# Patient Record
Sex: FEMALE | Race: WHITE | NOT HISPANIC OR LATINO | Employment: UNEMPLOYED
[De-identification: names, ages, dates, MRNs, and addresses within clinical notes are randomized per-mention and may not be internally consistent; named-entity substitution may affect disease eponyms.]

---

## 2022-12-07 ENCOUNTER — TRANSCRIBE ORDERS (OUTPATIENT)
Dept: OTHER | Age: 32
End: 2022-12-07

## 2022-12-07 DIAGNOSIS — J37.0 CHRONIC LARYNGITIS: Primary | ICD-10-CM

## 2022-12-07 DIAGNOSIS — R47.89 OTHER SPEECH DISTURBANCES: ICD-10-CM

## 2023-01-03 ENCOUNTER — TRANSFERRED RECORDS (OUTPATIENT)
Dept: HEALTH INFORMATION MANAGEMENT | Facility: CLINIC | Age: 33
End: 2023-01-03

## 2023-02-13 ENCOUNTER — HOSPITAL ENCOUNTER (OUTPATIENT)
Dept: SPEECH THERAPY | Facility: CLINIC | Age: 33
Discharge: HOME | End: 2023-02-13
Attending: NURSE PRACTITIONER
Payer: COMMERCIAL

## 2023-02-13 DIAGNOSIS — R49.0 DYSPHONIA: Primary | ICD-10-CM

## 2023-02-13 DIAGNOSIS — R47.89 OTHER SPEECH DISTURBANCES: ICD-10-CM

## 2023-02-13 DIAGNOSIS — J37.0 CHRONIC LARYNGITIS: ICD-10-CM

## 2023-02-13 PROCEDURE — 92507 TX SP LANG VOICE COMM INDIV: CPT | Mod: GN | Performed by: STUDENT IN AN ORGANIZED HEALTH CARE EDUCATION/TRAINING PROGRAM

## 2023-02-13 PROCEDURE — 92524 BEHAVRAL QUALIT ANALYS VOICE: CPT | Mod: GN | Performed by: STUDENT IN AN ORGANIZED HEALTH CARE EDUCATION/TRAINING PROGRAM

## 2023-02-13 NOTE — PROGRESS NOTES
Speech-Language Pathology Department   VOICE EVALUATION  [facility]    02/13/23 0845   General Information   Type Of Visit Initial   Start Of Care Date 02/13/23   Referring Physician Chuck Valadez MD  (ENT)   Orders Evaluate And Treat   Medical Diagnosis Bilateral vocal cord polyps, Chronic laryngitis   Onset Of Illness/injury Or Date Of Surgery 12/07/22  (order date)   Precautions/Limitations  no known precautions/limitations   Hearing WFL for 1:1 conversation in session   Surgical/Medical history reviewed Yes   Pertinent History Of Current Problem 33yo female presenting with ~3 month history of dysphonia.  Symptoms initially began around Thanksgiving.  Pt lost her voice and couldn't talk.  She would have to strain to talk, and sometimes sound wouldn't come out.  She was treated for acute pharyngitis, but her voice has still not returned to normal.  She reports that she will lose her voice easily with voice use.  She has been trying to drink lot of water and avoid yelling when she is able.  She reports occasional pain in her throat with voice use.  She has been unable to sing to her children unless she stays in a low range.  She denies dysphagia, dyspnea, and cough/throat clearing.  Laryngoscopy with ENT was significant for bilateral vocal fold polyps, and the plan is to try voice rest and vocal therapy prior to any surgical interventions.  Please see chart for additional PMH.   Prior Level of Functioning No previous problems.   Patient Role/employment History Employed  (--teaches group fitness classes)   General Observations Pt reports that today is a good voice day, noting that she has been sounding less hoarse after using her voice less for the past week.   Patient/family Goals To be able to talk and teach fitness classes without losing her voice   Personal Rating / Voice Use Rating   Comments Heavy vocal demands as a .  Pt reports  that she has a microphone when she teaches fitness classes, but she still needs to talk at a louder volume at times.  She has young children at home with associated vocal demands as well.   Evaluation Results   Voice Observations VISIBLE TENSION: Neck.  PALPATION OF THYROHYOID REGION: Firm musculature with no reported tenderness to palpation.   Voice Profile during conversation, 1 min monologue and paragraph reading   Voice Quality Hoarse;Raspy;Airy;Phonation gap   Voice quality comments SPEECH: Consistent moderate strain, consistent mild breathiness, and intermittent mild-moderate roughness.  SINGING: Consistent breathiness and strain that worsen at higher pitches, with intermittent roughness and phonation breaks.  THERAPY PROBES: Modest improvements in voice quality with flow mode, forward resonance, and semi-occluded vocal tract probes.   Voice quality severity rating continuum (1=Severe, 7=WNL) 4  (CAPE-V Overall Severity: 42/100)   Breath control Tight   Breath Control comments Inspirations for speech are shallow with poor respiratory/phonatory coordination.   Breath control severity rating continuum (1=Severe, 7=WNL) 5   Voice Use / Effort Pinched / squeezed larynx;Contraction of neck muscles;Throat push   Voice Use / Effort comments Pt rates her current phonatory effort for speech as 2-3/10 (10 is maximum effort), noting that effort can increase to 9-10/10 when symptoms are most severe.   Voice use / Effort severity rating continuum (1=Severe, 7=WNL) 5   Fundamental frequency (Hz) 174.61 Hz  (Centered around F3)   Pitch / Frequency comments Significantly reduced pitch range with loss of phonation in the upper register.   Pitch / Frequency severity rating continuum (1=Severe, 7=WNL) 4   Volume comments Volume for conversational speech is WFL and appropriate for the setting (1:1 conversation in quiet room).  A whisper is normal.  Soft phonation is breathy with brief phonation breaks.  Loud phonation is mildly  strained with neck involvement, and slightly reduced volume increase.   Volume severity rating continuum (1=Severe, 7=WNL) 6   Neuromuscular Control WNL   Neuromuscular Control severity rating continuum (1=Severe, 7=WNL) 7   Resonance Other   (Laryngeal pharyngeal focus resonance)   Resonance severity rating continuum (1=Severe, 7=WNL) 6   Comments Moderate dysphonia characterized by roughness, breathiness, strain, phonation breaks, poor respiratory/phonatory coordination, reduced pitch range with loss of higher pitches, and increased phonatory effort with tight laryngeal musculature and neck involvement during phonation.   Adduction /Abduction Function   Laryngeal diadokinetic speed   (WNL)   Laryngeal diadokinetic strength   (Mildly strained and rough)   Laryngeal diadokinetic consistency Regular   Adduction / Abduction function scale Age 11 - 65, norm per sec:  5+   Vibratory Function of Vocal Folds   Prolonged 'ah' at mid pitch (sec) 9.4 seconds at F3 with increased breathiness and intermittent roughness   Vibratory Function of Vocal Folds Scale Females 20 - 80 yrs: 10 - 22 secs   Vibratory Function of Vocal Folds Comments Reduced in duration and quality   Mucosal Function of the Vocal Folds   S/Z ratio (__ sec/ __sec = __percentage) 1.4  (>1.4 is suggestive of laryngeal pathology)   Vocal fold stiffness / swelling test Positive for stiffness/swelling   Function of Lengtheners / Shorteners (CT and TA Muscles)   Pitch glides Limited range;Upper register absent  (Lowest: C#3; Highest: D4)   Videostroboscopy / Endoscopy   Other observations Completed by Dr. Valadez, significant for bilateral vocal cord polyps in the setting of heavy voice use and recent pharyngitis.   General Therapy Interventions   Planned Therapy Interventions Voice   Voice Breath flow to sound flow;Voice quality/pitch or volume tasks;Resonant voice techniques;No larynx effort practice   Impressions and Recommendations   Communication Diagnosis  Dysphonia   Summary Ms. Patricia presents with moderate dysphonia characterized by roughness, breathiness, strain, phonation breaks, poor respiratory/phonatory coordination, reduced pitch range with loss of higher pitches, and increased phonatory effort with tight laryngeal musculature and neck involvement during phonation.  Based on today's evaluation, dysphonia is primarily accounted for by the bilateral vocal fold polyps and associated aberrations in vibratory characteristics and mucosal wave.  Dysphonia is likely compounded by hyperfunction and/or imbalance in function of the intrinsic and extrinsic laryngeal musculature and poor respiratory/phonatory coordination.   Recommendations A course of skilled speech therapy is recommended to optimize vocal technique, improve voice quality, promote reduced vocal fold impact to help reduce the vocal fold lesions, and promote reduced laryngeal effort, fatigue, discomfort, and irritation, so that patient is able to meet her extensive vocal demands at work and at home.   Frequency and Duration 1x/week for 6 weeks with 1-2 monthly follow-ups   Prognosis  Good with intervention   Risks and Benefits of Treatment have been explained. Yes   Patient & /or Caregiver  in agreement with plan of care Yes   Patient Education SLP provided education regarding evaluation findings and proposed POC.  Therapy initiated today.   Educational Assessment   Barriers to Learning No barriers   Preferred Learning Style Listening;Reading;Demonstration;Pictures / Video   Voice Goals   Voice Goals 1;2;3   Voice Goal 1   Goal Identifier Generalization   Goal Description Patient will report a week of typical activities in which dysphonia, vocal fatigue, and vocal effort do not exceed a level of 2 out of 10, 90% of the time, so that patient is able to meet her extensive vocal demands at work and at home.   Target Date 05/14/23   Voice Goal 2   Goal Identifier Voice quality   Goal Description In a 20-minute  speech task, patient will demonstrate roughness, breathiness, strain, and phonation breaks that do not exceed a level of 2 out of 10, 90% of the time by SLP judgment, so that patient is able to meet her voice quality demands.   Target Date 05/14/23   Voice Goal 3   Goal Identifier Hygiene/Impact   Goal Description Patient will learn, demonstrate, and implement use of 2-3 vocal hygiene strategies (e.g. hydration, periods of vocal rest, avoiding high vocal fold impact behaviors) to promote reduced laryngeal irritation and reduced vocal fold impact.   Target Date 05/14/23   Total Session Time   Voice Minutes (69830) 30   Total Evaluation Time 40     Thank you for the referral of this patient.    MADDIE Sarabia (livia) MCHRISSY, CCC-SLP  Speech-Language Pathologist  [facility] Certificate of Vocology  [facility]  [phone]

## 2023-04-12 ENCOUNTER — HOSPITAL ENCOUNTER (OUTPATIENT)
Dept: SPEECH THERAPY | Facility: CLINIC | Age: 33
Discharge: HOME | End: 2023-04-12
Payer: COMMERCIAL

## 2023-04-12 DIAGNOSIS — R47.89 OTHER SPEECH DISTURBANCES: ICD-10-CM

## 2023-04-12 DIAGNOSIS — J37.0 CHRONIC LARYNGITIS: ICD-10-CM

## 2023-04-12 DIAGNOSIS — R49.0 DYSPHONIA: Primary | ICD-10-CM

## 2023-04-12 PROCEDURE — 92507 TX SP LANG VOICE COMM INDIV: CPT | Mod: GN | Performed by: STUDENT IN AN ORGANIZED HEALTH CARE EDUCATION/TRAINING PROGRAM
